# Patient Record
Sex: FEMALE | Race: WHITE | NOT HISPANIC OR LATINO | Employment: OTHER | URBAN - METROPOLITAN AREA
[De-identification: names, ages, dates, MRNs, and addresses within clinical notes are randomized per-mention and may not be internally consistent; named-entity substitution may affect disease eponyms.]

---

## 2023-03-09 PROCEDURE — 87086 URINE CULTURE/COLONY COUNT: CPT | Performed by: FAMILY MEDICINE

## 2023-03-11 ENCOUNTER — TELEPHONE (OUTPATIENT)
Dept: URGENT CARE | Facility: CLINIC | Age: 68
End: 2023-03-11
Payer: COMMERCIAL

## 2023-03-11 NOTE — TELEPHONE ENCOUNTER
Patient notified of urine culture results, no UTI.  Patient states she is still symptomatic with dysuria and increased urinary frequency and urgency.  She states that she does have a history of cystitis with similar symptoms.  Recommend that the patient stop the Macrobid and continue the Pyridium as needed.  Also discussed limiting offending type food and drink and supportive care.  Patient verbalizes understanding.  She will follow-up as needed with us, ED and/or PCP.  She states she is new to the area and does not have an established PCP, but will call her insurance company again on Monday to find a PCP excepting her insurance.

## 2023-03-12 ENCOUNTER — TELEPHONE (OUTPATIENT)
Dept: URGENT CARE | Facility: CLINIC | Age: 68
End: 2023-03-12
Payer: COMMERCIAL

## 2023-03-12 NOTE — TELEPHONE ENCOUNTER
----- Message from YOON Vera sent at 3/11/2023  9:33 AM EST -----  Please notify patient of negative urine culture, no UTI.  May stop Macrobid at this time.  Recommend follow-up with PCP if symptoms persist.

## 2023-03-22 PROCEDURE — 87077 CULTURE AEROBIC IDENTIFY: CPT | Performed by: FAMILY MEDICINE

## 2023-03-22 PROCEDURE — 87070 CULTURE OTHR SPECIMN AEROBIC: CPT | Performed by: FAMILY MEDICINE

## 2023-03-22 PROCEDURE — 87205 SMEAR GRAM STAIN: CPT | Performed by: FAMILY MEDICINE

## 2023-03-26 ENCOUNTER — TELEPHONE (OUTPATIENT)
Dept: URGENT CARE | Facility: CLINIC | Age: 68
End: 2023-03-26
Payer: COMMERCIAL

## 2023-03-26 NOTE — TELEPHONE ENCOUNTER
----- Message from YOON Vera sent at 3/26/2023  1:17 PM EDT -----  Please notify patient of wound culture showing normal skin julio, no bacterial infection noted.  Patient to continue current plan of care and follow-up with PCP as needed or symptoms worsen or persist.

## 2023-06-07 ENCOUNTER — OFFICE VISIT (OUTPATIENT)
Dept: CARDIOLOGY | Facility: CLINIC | Age: 68
End: 2023-06-07
Payer: MEDICARE

## 2023-06-07 VITALS
HEART RATE: 73 BPM | SYSTOLIC BLOOD PRESSURE: 167 MMHG | WEIGHT: 142 LBS | BODY MASS INDEX: 24.24 KG/M2 | HEIGHT: 64 IN | DIASTOLIC BLOOD PRESSURE: 84 MMHG

## 2023-06-07 DIAGNOSIS — I48.0 PAROXYSMAL ATRIAL FIBRILLATION: Primary | ICD-10-CM

## 2023-06-07 DIAGNOSIS — I10 ESSENTIAL HYPERTENSION: ICD-10-CM

## 2023-06-07 PROCEDURE — 99204 OFFICE O/P NEW MOD 45 MIN: CPT | Performed by: INTERNAL MEDICINE

## 2023-06-07 PROCEDURE — 93000 ELECTROCARDIOGRAM COMPLETE: CPT | Performed by: INTERNAL MEDICINE

## 2023-06-07 RX ORDER — ATENOLOL 50 MG/1
50 TABLET ORAL DAILY
Qty: 90 TABLET | Refills: 3 | Status: SHIPPED | OUTPATIENT
Start: 2023-06-07

## 2023-06-07 NOTE — PROGRESS NOTES
"Chief Complaint  Atrial Fibrillation      History of Present Illness  Nichelle Castaneda presents to [facility]    This is a very pleasant six 8-year-old lady with past medical history significant for hypertension, paroxysmal atrial fibrillation and mitral valve prolapse presents to clinic to establish a new cardiologist.  She used to see a cardiologist in [place].  She was diagnosed with atrial fibrillation in 2018.  She has been on atenolol 50 mg daily.  She is not on blood thinners or aspirin due to concerns about their side effects and bleeding.  She has no cardiac complaints today.  She has no chest discomfort or dyspnea.  She has no palpitations, dizziness, presyncope or syncope.    Past Medical History:   Diagnosis Date    A-fib     Abnormal ECG 2018    AFIB & MVP    Arrhythmia     MPV    Clotting disorder 1967    very slow clot time    Heart valve disease 2018    3 problem valves    Mitral valve prolapse          Current Outpatient Medications:     ALPRAZolam (XANAX) 0.5 MG tablet, , Disp: , Rfl:     atenolol (TENORMIN) 50 MG tablet, Take 1 tablet by mouth Daily., Disp: 90 tablet, Rfl: 3    Medications Discontinued During This Encounter   Medication Reason    atenolol (TENORMIN) 50 MG tablet Reorder    nystatin (MYCOSTATIN) 584253 UNIT/GM powder      No Known Allergies     Social History     Tobacco Use    Smoking status: Never     Passive exposure: Never    Smokeless tobacco: Never   Vaping Use    Vaping Use: Never used   Substance Use Topics    Alcohol use: Yes     Alcohol/week: 28.0 standard drinks     Types: 28 Drinks containing 0.5 oz of alcohol per week     Comment: occ    Drug use: Never       Family History   Problem Relation Age of Onset    Anemia Mother             Arrhythmia Mother             Clotting disorder Mother             Clotting disorder Sister                 Objective     /84   Pulse 73   Ht 162.6 cm (64.02\") "   Wt 64.4 kg (142 lb)   BMI 24.36 kg/m²       Physical Exam  Constitutional:       General: Awake. Not in acute distress.     Appearance: Normal appearance.   Neck:      Vascular: No carotid bruit, hepatojugular reflux or JVD.   Cardiovascular:      Rate and Rhythm: Normal rate and regular rhythm.      Chest Wall: PMI is not displaced.      Heart sounds: Normal heart sounds, S1 normal and S2 normal. No murmur heard.   No friction rub. No gallop. No S3 or S4 sounds.    Pulmonary:      Effort: Pulmonary effort is normal.      Breath sounds: Normal breath sounds. No wheezing, rhonchi or rales.   Ext.      Right lower leg: No edema.      Left lower leg: No edema.   Skin:     General: Skin is warm and dry.      Coloration: Skin is not cyanotic.      Findings: No petechiae or rash.   Neurological:      Mental Status: Alert and oriented x 3  Psychiatric:         Behavior: Behavior is cooperative.       Result Review :     No results found for: PROBNP       No results found for: TSH   No results found for: FREET4   No results found for: DDIMERQUANT  No results found for: MG   No results found for: DIGOXIN   No results found for: TROPONINT   No results found for: POCTROP(              ECG 12 Lead    Date/Time: 6/7/2023 3:27 PM  Performed by: Jolene William MD  Authorized by: Jolene William MD   Previous ECG: no previous ECG available  Rhythm: sinus rhythm  BPM: 61  Other findings: left ventricular hypertrophy          No results found for this or any previous visit.                Diagnoses and all orders for this visit:    1. Paroxysmal atrial fibrillation (Primary)    2. Essential hypertension    Other orders  -     atenolol (TENORMIN) 50 MG tablet; Take 1 tablet by mouth Daily.  Dispense: 90 tablet; Refill: 3  -     ECG 12 Lead      Assessment:    -Paroxysmal atrial fibrillation: She is currently in normal sinus rhythm.  [id number] score is 3 for age, female gender and high blood pressure.  I discussed the  risk versus benefits of anticoagulation with her at length and she declines to take blood thinners due to concerns about her side effects understanding the risk of thromboembolic events and stroke.  The option of left atrial appendage closure device/Watchman device was discussed with her at length and she is not interested in having this procedure done either.  Continue atenolol 50 mg daily.  Records will be requested from previous cardiologist.    -Essential hypertension, blood pressure is elevated.  She reports whitecoat syndrome.  Her home blood pressure readings are within goal range.  Continue atenolol and blood pressure monitoring.    Follow Up       Return in about 6 months (around 12/7/2023) for With Rachael NETTLES.    Patient was given instructions and counseling regarding her condition or for health maintenance advice. Please see specific information pulled into the AVS if appropriate.

## 2023-07-07 PROBLEM — F41.9 ANXIETY: Status: ACTIVE | Noted: 2023-07-07

## 2023-07-07 PROBLEM — I48.0 PAF (PAROXYSMAL ATRIAL FIBRILLATION): Status: ACTIVE | Noted: 2023-07-07

## 2023-07-25 ENCOUNTER — TELEPHONE (OUTPATIENT)
Dept: INTERNAL MEDICINE | Facility: CLINIC | Age: 68
End: 2023-07-25
Payer: MEDICARE

## 2023-08-17 ENCOUNTER — TELEPHONE (OUTPATIENT)
Dept: CARDIOLOGY | Facility: CLINIC | Age: 68
End: 2023-08-17

## 2023-08-17 NOTE — TELEPHONE ENCOUNTER
Caller: Nichelle Castaneda    Relationship: Self    Best call back number: [phone]    What form or medical record are you requesting: PATIENT RECORDS    Who is requesting this form or medical record from you: DR PERSAUD FROM PATIENTS PREVIOUS CARDIOLOGIST    How would you like to receive the form or medical records (pick-up, mail, fax): FAX  If fax, what is the fax number: [phone]    Timeframe paperwork needed: ASAP    Additional notes: OLD CARDIOLOGIST IS   WHEN REQUESTING HER PREVIOUS MEDICAL RECORDS THEY SAID TO DR JOSIANE KRISHNA. REQUEST ALL MEDICAL RECORDS AND NOT CERTAIN ONES AND TO SEND IT OVER WITH ATTENTION TO ALEXANDER

## 2024-06-11 RX ORDER — ATENOLOL 50 MG/1
50 TABLET ORAL DAILY
Qty: 90 TABLET | Refills: 3 | Status: SHIPPED | OUTPATIENT
Start: 2024-06-11